# Patient Record
Sex: FEMALE | Race: WHITE | NOT HISPANIC OR LATINO | Employment: PART TIME | URBAN - METROPOLITAN AREA
[De-identification: names, ages, dates, MRNs, and addresses within clinical notes are randomized per-mention and may not be internally consistent; named-entity substitution may affect disease eponyms.]

---
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
## 2023-01-19 ENCOUNTER — HOSPITAL ENCOUNTER (EMERGENCY)
Facility: CLINIC | Age: 23
Discharge: HOME | End: 2023-01-19
Attending: PSYCHIATRY & NEUROLOGY | Admitting: PSYCHIATRY & NEUROLOGY
Payer: COMMERCIAL

## 2023-01-19 VITALS
OXYGEN SATURATION: 100 % | RESPIRATION RATE: 16 BRPM | TEMPERATURE: 99 F | HEIGHT: 64 IN | BODY MASS INDEX: 25.68 KG/M2 | DIASTOLIC BLOOD PRESSURE: 85 MMHG | HEART RATE: 73 BPM | WEIGHT: 150.4 LBS | SYSTOLIC BLOOD PRESSURE: 121 MMHG

## 2023-01-19 DIAGNOSIS — F41.1 GAD (GENERALIZED ANXIETY DISORDER): ICD-10-CM

## 2023-01-19 LAB
AMPHETAMINES UR QL SCN: ABNORMAL
BARBITURATES UR QL: ABNORMAL
BENZODIAZ UR QL: ABNORMAL
CANNABINOIDS UR QL SCN: ABNORMAL
COCAINE UR QL: ABNORMAL
HCG UR QL: NEGATIVE
OPIATES UR QL SCN: ABNORMAL
SARS-COV-2 RNA RESP QL NAA+PROBE: NEGATIVE

## 2023-01-19 PROCEDURE — 81025 URINE PREGNANCY TEST: CPT | Performed by: PSYCHIATRY & NEUROLOGY

## 2023-01-19 PROCEDURE — 99285 EMERGENCY DEPT VISIT HI MDM: CPT | Mod: 25 | Performed by: PSYCHIATRY & NEUROLOGY

## 2023-01-19 PROCEDURE — 80307 DRUG TEST PRSMV CHEM ANLYZR: CPT | Performed by: PSYCHIATRY & NEUROLOGY

## 2023-01-19 PROCEDURE — U0005 INFEC AGEN DETEC AMPLI PROBE: HCPCS | Performed by: PSYCHIATRY & NEUROLOGY

## 2023-01-19 PROCEDURE — C9803 HOPD COVID-19 SPEC COLLECT: HCPCS | Performed by: PSYCHIATRY & NEUROLOGY

## 2023-01-19 PROCEDURE — 99284 EMERGENCY DEPT VISIT MOD MDM: CPT | Performed by: PSYCHIATRY & NEUROLOGY

## 2023-01-19 PROCEDURE — 90791 PSYCH DIAGNOSTIC EVALUATION: CPT

## 2023-01-19 RX ORDER — CLONIDINE HYDROCHLORIDE 0.1 MG/1
TABLET ORAL
Qty: 30 TABLET | Refills: 0 | Status: SHIPPED | OUTPATIENT
Start: 2023-01-19

## 2023-01-19 ASSESSMENT — COLUMBIA-SUICIDE SEVERITY RATING SCALE - C-SSRS
2. HAVE YOU ACTUALLY HAD ANY THOUGHTS OF KILLING YOURSELF?: NO
LETHALITY/MEDICAL DAMAGE CODE MOST LETHAL POTENTIAL ATTEMPT: BEHAVIOR LIKELY TO RESULT IN INJURY BUT NOT LIKELY TO CAUSE DEATH
ATTEMPT LIFETIME: YES
MOST RECENT DATE: 64851
TOTAL  NUMBER OF ACTUAL ATTEMPTS LIFETIME: 1
6. HAVE YOU EVER DONE ANYTHING, STARTED TO DO ANYTHING, OR PREPARED TO DO ANYTHING TO END YOUR LIFE?: NO
1. HAVE YOU WISHED YOU WERE DEAD OR WISHED YOU COULD GO TO SLEEP AND NOT WAKE UP?: YES
LETHALITY/MEDICAL DAMAGE CODE MOST LETHAL ACTUAL ATTEMPT: NO PHYSICAL DAMAGE OR VERY MINOR PHYSICAL DAMAGE
5. HAVE YOU STARTED TO WORK OUT OR WORKED OUT THE DETAILS OF HOW TO KILL YOURSELF? DO YOU INTEND TO CARRY OUT THIS PLAN?: NO
MOST LETHAL DATE: 64851
TOTAL  NUMBER OF INTERRUPTED ATTEMPTS LIFETIME: NO
LETHALITY/MEDICAL DAMAGE CODE FIRST POTENTIAL ATTEMPT: BEHAVIOR LIKELY TO RESULT IN INJURY BUT NOT LIKELY TO CAUSE DEATH
LETHALITY/MEDICAL DAMAGE CODE FIRST ACTUAL ATTEMPT: NO PHYSICAL DAMAGE OR VERY MINOR PHYSICAL DAMAGE
FIRST ATTEMPT DATE: 64851
LETHALITY/MEDICAL DAMAGE CODE MOST RECENT POTENTIAL ATTEMPT: BEHAVIOR LIKELY TO RESULT IN INJURY BUT NOT LIKELY TO CAUSE DEATH
2. HAVE YOU ACTUALLY HAD ANY THOUGHTS OF KILLING YOURSELF?: YES
TOTAL  NUMBER OF ABORTED OR SELF INTERRUPTED ATTEMPTS LIFETIME: NO
1. IN THE PAST MONTH, HAVE YOU WISHED YOU WERE DEAD OR WISHED YOU COULD GO TO SLEEP AND NOT WAKE UP?: NO
4. HAVE YOU HAD THESE THOUGHTS AND HAD SOME INTENTION OF ACTING ON THEM?: NO
3. HAVE YOU BEEN THINKING ABOUT HOW YOU MIGHT KILL YOURSELF?: NO
REASONS FOR IDEATION PAST MONTH: DOES NOT APPLY
ATTEMPT PAST THREE MONTHS: NO
REASONS FOR IDEATION LIFETIME: DOES NOT APPLY
LETHALITY/MEDICAL DAMAGE CODE MOST RECENT ACTUAL ATTEMPT: NO PHYSICAL DAMAGE OR VERY MINOR PHYSICAL DAMAGE

## 2023-01-19 ASSESSMENT — ACTIVITIES OF DAILY LIVING (ADL): ADLS_ACUITY_SCORE: 35

## 2023-01-19 NOTE — Clinical Note
Clemencia Hess was seen and treated in our emergency department on 1/19/2023.  She may return to work on 01/20/2023.       If you have any questions or concerns, please don't hesitate to call.      Francisco J Sinha MD

## 2023-01-20 NOTE — ED TRIAGE NOTES
Patient reported her anxiety is getting worse. She said it used to be controlled by therapy and coping mechanism, now her anxiety affects her ADLs.      Triage Assessment     Row Name 01/19/23 1936       Triage Assessment (Adult)    Airway WDL WDL       Respiratory WDL    Respiratory WDL WDL       Skin Circulation/Temperature WDL    Skin Circulation/Temperature WDL WDL       Cardiac WDL    Cardiac WDL WDL       Peripheral/Neurovascular WDL    Peripheral Neurovascular WDL WDL       Cognitive/Neuro/Behavioral WDL    Cognitive/Neuro/Behavioral WDL WDL

## 2023-01-20 NOTE — CONSULTS
Diagnostic Evaluation Consultation  Crisis Assessment    Patient was assessed: In Person  Patient location: [facility] ED, H-B  Was a release of information signed: Yes. Providers included on the release: [facility] and [facility]      Referral Data and Chief Complaint  Clemencia is a 22 year old, who uses she/her pronouns, and presents to the ED with family/friends. Patient is referred to the ED by self. Patient is presenting to the ED for the following concerns: anxiety.      Informed Consent and Assessment Methods     Patient is her own guardian. Writer met with patient and explained the crisis assessment process, including applicable information disclosures and limits to confidentiality, assessed understanding of the process, and obtained consent to proceed with the assessment. Patient was observed to be able to participate in the assessment as evidenced by being alert, oriented, and engaged. Assessment methods included conducting a formal interview with patient, review of medical records, collaboration with medical staff, and obtaining relevant collateral information from family and community providers when available.    Over the course of this crisis assessment provided reassurance, offered validation, engaged patient in problem solving and disposition planning and worked with patient on safety and aftercare planning. Patient's response to interventions was calm and cooperative.      Summary of Patient Situation     Patient presents to [facility] ED with her partner for evaluation of worsening anxiety. Patient reports a hx of borderline personality disorder, depression and anxiety. She reports that she has been so anxious each day that she can't eat and has extreme difficulty engaging in other daily living activities like showering. Reports she no-called, no-showed for work today because her anxiety was too intense and is continuing to worry about how she will be able to make it in to her shift  tomorrow. She reports that even though she feels like she is starving, she still can't bring herself to eat due to her anxiety. 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 She reports everything has been going really well in her life and she has much to look forward to, but reports that her anxiety continues to worsen. She reports that she has been seeing a therapist and feels she has learned as much as she can with this method to help cope with her anxiety and now recognizes she may need additional support with medication. She reports that she used to take medication and has tried a number of them, but she did not feel like they were helpful at the time and she felt forced into it by adults in her life. Now that she lives on her own though and has the ability to decide this for herself, she feels ready to re-start on a medication regimen.  Denies SI, HI, NSSI, hallucinations or delusions. Endorses occasional delta-8 and marijuana use and reports this was to try to help motivate her to eat.     Brief Psychosocial History    Patient currently lives in an apartment with her boyfriend and dog. She reports that she moved out of her family home roughly 7 months ago and everything has been going really well and she is quite happy with her life at this moment. 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 She does report feeling some burnout from this. She reports having good relationships with her coworkers and boss. She reports feeling safe and supported by the people in her life. No relevant legal issues reported.     Significant Clinical History     Patient reports a hx of borderline personality disorder, anxiety and depression. She sees a therapist weekly and reports this is helpful and she has learned a lot. She has a PCP. No other outpatient providers at this time. She is not  prescribed any medication at this time, but reports she used to be. She stopped taking them though and states she has been on and off with medication for the past few years. No hx of IP MH. Endorses hx of trauma in adolescence, did not specify. Endorses occasional marijuana and delta-8 use, denies any other substance use.      Collateral Information    Blessing Adams (mother) [phone], epic records     Risk Assessment    Donaldson Suicide Severity Rating Scale Full Clinical Version: 1/19/23, moderate risk  Suicidal Ideation  1. Wish to be Dead (Lifetime): Yes  1. Wish to be Dead (Past 1 Month): No  2. Non-Specific Active Suicidal Thoughts (Lifetime): Yes  2. Non-Specific Active Suicidal Thoughts (Past 1 Month): No  3. Active Suicidal Ideation with any Methods (Not Plan) Without Intent to Act (Lifetime): No  4. Active Suicidal Ideation with Some Intent to Act, Without Specific Plan (Lifetime): No  5. Active Suicidal Ideation with Specific Plan and Intent (Lifetime): No  Intensity of Ideation  Most Severe Ideation Rating (Lifetime): 2  Most Severe Ideation Rating (Past 1 Month): 1  Frequency (Lifetime): Less than once a week  Frequency (Past 1 Month): Less than once a week  Duration (Lifetime): Fleeting, few seconds or minutes  Duration (Past 1 Month): Fleeting, few seconds or minutes  Controllability (Lifetime): Easily able to control thoughts  Controllability (Past 1 Month): Easily able to control thoughts  Deterrents (Lifetime): Deterrents definitely stopped you from attempting suicide  Deterrents (Past 1 Month): Deterrents definitely stopped you from attempting suicide  Reasons for Ideation (Lifetime): Does not apply  Reasons for Ideation (Past 1 Month): Does not apply  Suicidal Behavior  Actual Attempt (Lifetime): Yes  Total Number of Actual Attempts (Lifetime): 1  Actual Attempt (Past 3 Months): No  Has subject engaged in non-suicidal self-injurious behavior? (Lifetime): No  Interrupted Attempts (Lifetime):  No  Aborted or Self-Interrupted Attempt (Lifetime): No  Preparatory Acts or Behavior (Lifetime): No  C-SSRS Risk (Lifetime/Recent)  Calculated C-SSRS Risk Score (Lifetime/Recent): Moderate Risk     Actual/Potential Lethality (Most Lethal Attempt)  Most Lethal Attempt Date: 07/22/18  Actual Lethality/Medical Damage Code (Most Lethal Attempt): No physical damage or very minor physical damage  Potential Lethality Code (Most Lethal Attempt): Behavior likely to result in injury but not likely to cause death       Validity of evaluation is not impacted by presenting factors during interview.   Comments regarding subjective versus objective responses to Britton tool: see clinical narrative.   Environmental or Psychosocial Events: other: anxiety around current job  Chronic Risk Factors: history of suicide attempts (1)   Warning Signs: anxiety, agitation, unable to sleep, sleeping all the time  Protective Factors: strong bond to family unit, community support, or employment, intact marriage or domestic partnership, responsibilities and duties to others, including pets and children, lives in a responsibly safe and stable environment, good treatment engagement, sense of importance of health and wellness, able to access care without barriers, supportive ongoing medical and mental health care relationships, help seeking, good impulse control, good problem-solving, coping, and conflict resolution skills, optimistic outlook - identification of future goals and constructive use of leisure time, enjoyable activities, resilience  Interpretation of Risk Scoring, Risk Mitigation Interventions and Safety Plan:  Patient is assessed to be of moderate risk of harm to herself based on the [place] screening. However, patient denies any SI since their attempt in 2018 and is able to identify numerous protective factors and future thinking. Able to engage in safety planning and denies any SI at this time.        Does the patient have thoughts  of harming others? No     Is the patient engaging in sexually inappropriate behavior?  no        Current Substance Abuse     Is there recent substance abuse? Yes. Delta-8 and marijuana, occasionally.     Was a urine drug screen or blood alcohol level obtained: Yes. Positive for cannabinoids.       Mental Status Exam     Affect: Appropriate   Appearance: Appropriate    Attention Span/Concentration: Attentive  Eye Contact: Engaged   Fund of Knowledge: Appropriate    Language /Speech Content: Fluent   Language /Speech Volume: Loud    Language /Speech Rate/Productions: Hyperverbal    Recent Memory: Intact   Remote Memory: Intact   Mood: Anxious    Orientation to Person: Yes    Orientation to Place: Yes   Orientation to Time of Day: Yes    Orientation to Date: Yes    Situation (Do they understand why they are here?): Yes    Psychomotor Behavior: Normal    Thought Content: Clear   Thought Form: Intact      History of commitment: No    Medication    Psychotropic medications: No  Medication changes made in the last two weeks: No       Current Care Team    Primary Care Provider: JO Boudreaux, PAKseniaC, [facility]  Psychiatrist: No  Therapist: Jenise Saucedo MA, [facility], [facility], weekly, helpful [phone]  : No     CTSS or ARMHS: No  ACT Team: No  Other: No      Diagnosis    1 Generalized anxiety disorder F41.1 - Primary, By History       2 Borderline personality disorder F60.3 - By History       3 Major depressive disorder, Recurrent episode, Moderate F33.1 - By History    Clinical Summary and Substantiation of Recommendations    After therapeutic assessment, intervention and aftercare planning by ED care team and LM and in consultation with attending provider, the patient's circumstances and mental state were appropriate for outpatient management. It is the recommendation of this clinician that pt discharge with OP MH support. A this time the pt is not presenting as an acute  risk to self or others due to the following factors: pt denies SI, HI, NSSI, hallucinations or delusions. Endorses occasional delta-8 and marijuana use. Patient has an established therapist they plan to continue seeing. Patient is able to engage in safety planning. Agreeable to referral for medication management. Patient was seen by Dr. Sinha at the time of assessment and was offered to trial clonodine 0.1 mg (0.05 mg prn during the day and 0.1 mg at bedtime) to help manage anxiety symptoms while awaiting appointment with med provider and was also agreeable to this.   Disposition    Recommended disposition: Individual Therapy and Medication Management       Reviewed case and recommendations with attending provider. Attending Name: Dr. Sinha       Attending concurs with disposition: Yes       Patient concurs with disposition: Yes       Guardian concurs with disposition: NA      Final disposition: Individual therapy  and Medication management.     Outpatient Details (if applicable):   Aftercare plan and appointments placed in the AVS and provided to patient: Yes. Given to patient by ED Nursing Staff    Assessment Details    Patient interview started at: 8:15 pm and completed at: 9:00 pm.     Total duration spent on the patient case in minutes: .75 hrs      CPT code(s) utilized: 42346 - Psychotherapy for Crisis - 60 (30-74*) min       VARINDER Terrell, Psychotherapist  [facility] - Triage & Transition Services  Callback: [phone]      Aftercare Plan  If I am feeling unsafe or I am in a crisis, I will:   Contact my established care providers   Call the National Suicide Prevention Lifeline: 988  Go to the nearest emergency room   Call 911     Warning signs that I or other people might notice when a crisis is developing for me: thoughts of harming myself or others, feeling unable to keep myself safe, symptoms do not improve or worsen    Things I am able to do on my own or with others to cope or help me feel better: art,  "sculpt, cook, listen to music, talk to loved ones, go for a walk, practice yoga    Changes I can make to support my mental health and wellness: adhere to treatment recommendations, take medications as prescribed, follow up with all outpatient providers and scheduled appointments, continue with individual therapy    People in my life that I can ask for help: mom, boyfriend, friends, family, therapist    Your [facility] has a mental health crisis team you can call 24/7: [facility] Mobile Crisis  [phone]     Crisis Lines  Crisis Text Line  Text 028889  You will be connected with a trained live crisis counselor to provide support.    Por espanol, texto  LUKE a 191782 o texto a 442-AYUDAME en WhatsApp    [employer] (LGBTQ Youth Crisis Line)  2.230.690.8409  text START to 175-826      Gamida Cell  Fast Tracker  Linking people to mental health and substance use disorder resources  KeepFu.Experts 911     [place] Mental [facility] Warm Line  Peer to peer support  Monday thru Saturday, 12 pm to 10 pm  446.755.5711 or 9.729.555.7700  Text \"Support\" to 17654    National [place] on Mental Illness (ADEOLA)  079.970.7884 or 1.888.ADEOLA.HELPS      Mental Health Apps  My3  https://my3app.org/    VirtualHopeBox  https://[employer].org/apps/virtual-hope-box/      Additional Information  Today you were seen by a licensed mental health professional through Triage and Transition services, [facility] ([facility])  for a crisis assessment in the Emergency Department at [facility].  It is recommended that you follow up with your established providers (psychiatrist, mental health therapist, and/or primary care doctor - as relevant) as soon as possible. Coordinators from [facility] will be calling you in the next 24-48 hours to ensure that you have the resources you need.  You can also contact [facility] coordinators directly at [phone]. You may have been scheduled for or offered an appointment " with a mental health provider. [facility] maintains an extensive network of licensed behavioral health providers to connect patients with the services they need.  We do not charge providers a fee to participate in our referral network.  We match patients with providers based on a patient's specific needs, insurance coverage, and location.  Our first effort will be to refer you to a provider within your care system, and will utilize providers outside your care system as needed.      Grounding Techniques:    Try to notice where you are, your surroundings including the people, the sounds like the TV or radio.    Concentrate on your breathing. Take a deep cleansing breath from your diaphragm. Count the breaths as you exhale. Make sure you breath slowly.    Hold something that you find comforting, for some it may be a stuffed animal or a blanket. Notice how it feels in your hands. Is it hard or soft?    During a non-crisis time make a list of positive affirmations. Print them out and keep them handy for times of intense anxiety. At those times, read them aloud.  Try the [employer] game:    Name 5 things you can see in the room with you    Name 4 things you can feel ( chair on my back  or  feet on floor )     Name 3 things you can hear right now ( people talking  or  tv )     Name 2 things you can smell right now (or, 2 things you like the smell of)     Name 1 good thing about yourself  Create A Safe Place    Image a safe place -- it can be a real or imaginary place:     What do you see -- especially colors?     What sounds do you hear?     What sensations do you feel?     What smells do you smell?     What people or animals would you want in your safe place?     Imagine a protective bubble, wall or boundary around your safe place.     Imagine a door or gate with a guard at your safe place.     Image a lock and key to your safe place and only you can unlock it.    You can draw or make a collage that represents your safe place.      Choose a souvenir of your safe place -- a color, an object, a song.     Keep your image of your safe place so you can come back to it when you need to.

## 2023-01-20 NOTE — ED PROVIDER NOTES
"ED Provider Note  [facility]      History     Chief Complaint   Patient presents with     Anxiety     HPI  Clemencia Hess is a 22 year old female who is here with her boyfriend with concerns for anxiety that is starting to interfere with her ability to function at work. Patient has history of borderline personality disorder and depression and anxiety. She also believes she has untreated ADHD. She reports being prescribed sertraline which she felt worked best and other SSRIs that did not work as well. She tried hydroxyzine that does not seem to affect her at all and gabapentin but gets too sedated to drive during the day. She has opted not to take any meds presently. She is not connected to a therapist nor a med provider and is interested in seeing them. Patient denies feeling suicidal nor unsafe. She feels comfortable going home, but would like a med consultation and a work note that indicates she is seeking help.    Past Medical History  Past Medical History:   Diagnosis Date     Anxiety      Depressive disorder      History reviewed. No pertinent surgical history.  cloNIDine (CATAPRES) 0.1 MG tablet  NONFORMULARY      No Known Allergies  Family History  History reviewed. No pertinent family history.  Social History   Social History     Tobacco Use     Smoking status: Every Day     Types: Cigarettes, Vaping Device     Smokeless tobacco: Never   Substance Use Topics     Alcohol use: Yes     Comment: occ         A complete review of systems was performed with pertinent positives and negatives noted in the HPI, and all other systems negative.    Physical Exam   BP: 124/86  Pulse: 105  Temp: 99  F (37.2  C)  Resp: 16  Height: 162.6 cm (5' 4\")  Weight: 68.2 kg (150 lb 6.4 oz)  SpO2: 98 %  Physical Exam  Vitals and nursing note reviewed.   HENT:      Head: Normocephalic.   Eyes:      Pupils: Pupils are equal, round, and reactive to light.   Pulmonary:      Effort: Pulmonary effort is normal. "   Musculoskeletal:         General: Normal range of motion.      Cervical back: Normal range of motion.   Neurological:      General: No focal deficit present.      Mental Status: She is alert.   Psychiatric:         Attention and Perception: She is inattentive. She does not perceive auditory or visual hallucinations.         Mood and Affect: Affect normal. Mood is anxious.         Speech: Speech normal.         Behavior: Behavior normal. Behavior is not agitated, aggressive, hyperactive or combative. Behavior is cooperative.         Thought Content: Thought content normal. Thought content is not paranoid or delusional. Thought content does not include homicidal or suicidal ideation.         Cognition and Memory: Cognition and memory normal.         Judgment: Judgment normal.         ED Course, Procedures, & Data      Procedures            Results for orders placed or performed during the hospital encounter of 01/19/23   HCG qualitative urine     Status: Normal   Result Value Ref Range    hCG Urine Qualitative Negative Negative   Drug abuse screen 1 urine (ED)     Status: Abnormal   Result Value Ref Range    Amphetamines Urine Screen Negative Screen Negative    Barbiturates Urine Screen Negative Screen Negative    Benzodiazepines Urine Screen Negative Screen Negative    Cannabinoids Urine Screen Positive (A) Screen Negative    Cocaine Urine Screen Negative Screen Negative    Opiates Urine Screen Negative Screen Negative   Asymptomatic COVID-19 Virus (Coronavirus) by PCR Nose     Status: Normal    Specimen: Nose; Swab   Result Value Ref Range    SARS CoV2 PCR Negative Negative    Narrative    Testing was performed using the Xpert Xpress SARS-CoV-2 Assay on the Cepheid Gene-Xpert Instrument Systems. Additional information about this Emergency Use Authorization (EUA) assay can be found via the Lab Guide. This test should be ordered for the detection of SARS-CoV-2 in individuals who meet SARS-CoV-2 clinical and/or  epidemiological criteria as well as from individuals without symptoms or other reasons to suspect COVID-19. Test performance for asymptomatic patients has only been established in anterior nasal swab specimens. This test is for in vitro diagnostic use under the FDA EUA for laboratories certified under CLIA to perform high complexity testing. This test has not been FDA cleared or approved. A negative result does not rule out the presence of PCR inhibitors in the specimen or target RNA concentration below the limit of detection for the assay. The possibility of a false negative should be considered if the patient's recent exposure or clinical presentation suggests COVID-19. This test was validated by the [facility]. This laboratory is certified under the Clinical Laboratory Improvement Amendments (CLIA) as qualified to perform high complexity laboratory testing.     Urine Drugs of Abuse Screen     Status: Abnormal    Narrative    The following orders were created for panel order Urine Drugs of Abuse Screen.  Procedure                               Abnormality         Status                     ---------                               -----------         ------                     Drug abuse screen 1 urin...[[id number]]  Abnormal            Final result                 Please view results for these tests on the individual orders.     Medications - No data to display  Labs Ordered and Resulted from Time of ED Arrival to Time of ED Departure   DRUG ABUSE SCREEN 1 URINE (ED) - Abnormal       Result Value    Amphetamines Urine Screen Negative      Barbiturates Urine Screen Negative      Benzodiazepines Urine Screen Negative      Cannabinoids Urine Screen Positive (*)     Cocaine Urine Screen Negative      Opiates Urine Screen Negative     HCG QUALITATIVE URINE - Normal    hCG Urine Qualitative Negative     COVID-19 VIRUS (CORONAVIRUS) BY PCR - Normal    SARS CoV2 PCR Negative       No  orders to display          Medical Decision Making  The patient presented with a problem that is a chronic illness mild to moderate exacerbation, progression, or side effect of treatment.    The patient's evaluation involved:  history and exam without other MDM data elements    The patient's management involved prescription drug management.      Assessment & Plan    Patient is here for a mental health assessment. She is feeling anxious that is starting to interfere with her ability to work and comes in seeking further help. Patient is open to a trial of clonidine to manage anxiety and sleep concerns as I also mentioned that its long-acting formulation has an FDA-approved indication to treat ADHD. She was given a prescription. [facility] made a referral for therapy and follow-up med provider. Patient was given a work-slip for her ED visit today. She can be discharged.    I have reviewed the nursing notes. I have reviewed the findings, diagnosis, plan and need for follow up with the patient.    New Prescriptions    CLONIDINE (CATAPRES) 0.1 MG TABLET    Take 1/2 tablet (0.05 mg) during the day as needed for anxiety and 1 tablet at bedtime (0.1 mg) to manage anxiety and sleep       Final diagnoses:   NADIA (generalized anxiety disorder)       Francisco J Sinha MD  [facility] EMERGENCY DEPARTMENT  1/19/2023     Francisco J Sinha MD  01/19/23 [id number]

## 2023-01-20 NOTE — DISCHARGE INSTRUCTIONS
Therapy Plan    Scheduled Appointment  Date: Tuesday, 1/24/2023    Time: 10:00 am - 11:00 am  Provider: Rose Mary Ceballos MA, CNP,RN  Location: [facility], [address] [place] [address], [address], [place] [address]  Phone: [phone]  Type: Telepsychiatry (Virtual)    Patient Instructions  Please fill New Patient Form by using following link. All forms need to be completed 72 hours prior to the appointment date/time by going to [email or web address]. Please call us on [phone] 96 hours prior to your scheduled appointment to confirm that you are able to attend. We will provide you information about how to log into video call software when you call.    Aftercare Plan  If I am feeling unsafe or I am in a crisis, I will:   Contact my established care providers   Call the National Suicide Prevention Lifeline: 988  Go to the nearest emergency room   Call 911     Warning signs that I or other people might notice when a crisis is developing for me: thoughts of harming myself or others, feeling unable to keep myself safe, symptoms do not improve or worsen    Things I am able to do on my own or with others to cope or help me feel better: art, sculpt, cook, listen to music, talk to loved ones, go for a walk, practice yoga    Changes I can make to support my mental health and wellness: adhere to treatment recommendations, take medications as prescribed, follow up with all outpatient providers and scheduled appointments, continue with individual therapy    People in my life that I can ask for help: mom, boyfriend, friends, family, therapist    Your [facility] has a mental health crisis team you can call 24/7: [facility] Mobile Crisis  [phone]     Crisis Lines  Crisis Text Line  Text 644274  You will be connected with a trained live crisis counselor to provide support.    Por espanol, texto  LUKE a 604061 o texto a 442-AYUDAME en WhatsApp    [employer] [place] (LGBTQ Youth Crisis Line)   "8.552.623.0165  text START to 926-786      Community Resources  Fast Tracker  Linking people to mental health and substance use disorder resources  [employer]     [place] Mental Health Warm Line  Peer to peer support  Monday thru Saturday, 12 pm to 10 pm  453.278.2846 or 5.578.976.4555  Text \"Support\" to 59779    National [place] on Mental Illness (ADEOLA)  659.746.1099 or 1.888.ADEOLA.HELPS      Mental Health Apps  My3  https://[employer].org/    VirtualHopeBox  https://[employer]/apps/virtual-hope-box/      Additional Information  Today you were seen by a licensed mental health professional through Triage and Transition services, [facility] ([facility])  for a crisis assessment in the Emergency Department at [facility].  It is recommended that you follow up with your established providers (psychiatrist, mental health therapist, and/or primary care doctor - as relevant) as soon as possible. Coordinators from [facility] will be calling you in the next 24-48 hours to ensure that you have the resources you need.  You can also contact [facility] coordinators directly at [phone]. You may have been scheduled for or offered an appointment with a mental health provider. [facility] maintains an extensive network of licensed behavioral health providers to connect patients with the services they need.  We do not charge providers a fee to participate in our referral network.  We match patients with providers based on a patient's specific needs, insurance coverage, and location.  Our first effort will be to refer you to a provider within your care system, and will utilize providers outside your care system as needed.      Grounding Techniques:  Try to notice where you are, your surroundings including the people, the sounds like the TV or radio.  Concentrate on your breathing. Take a deep cleansing breath from your diaphragm. Count the breaths as you exhale. Make sure you breath slowly.  Hold something " that you find comforting, for some it may be a stuffed animal or a blanket. Notice how it feels in your hands. Is it hard or soft?  During a non-crisis time make a list of positive affirmations. Print them out and keep them handy for times of intense anxiety. At those times, read them aloud.  Try the CRAZE game:  Name 5 things you can see in the room with you  Name 4 things you can feel ( chair on my back  or  feet on floor )   Name 3 things you can hear right now ( people talking  or  tv )   Name 2 things you can smell right now (or, 2 things you like the smell of)   Name 1 good thing about yourself  Create A Safe Place  Image a safe place -- it can be a real or imaginary place:   What do you see -- especially colors?   What sounds do you hear?   What sensations do you feel?   What smells do you smell?   What people or animals would you want in your safe place?   Imagine a protective bubble, wall or boundary around your safe place.   Imagine a door or gate with a guard at your safe place.   Image a lock and key to your safe place and only you can unlock it.  You can draw or make a collage that represents your safe place.   Choose a souvenir of your safe place -- a color, an object, a song.   Keep your image of your safe place so you can come back to it when you need to.